# Patient Record
Sex: FEMALE | Race: WHITE | NOT HISPANIC OR LATINO | URBAN - METROPOLITAN AREA
[De-identification: names, ages, dates, MRNs, and addresses within clinical notes are randomized per-mention and may not be internally consistent; named-entity substitution may affect disease eponyms.]

---

## 2018-01-15 ENCOUNTER — EMERGENCY (EMERGENCY)
Facility: HOSPITAL | Age: 83
LOS: 1 days | Discharge: HOME | End: 2018-01-15
Admitting: EMERGENCY MEDICINE
Payer: COMMERCIAL

## 2018-01-15 VITALS
DIASTOLIC BLOOD PRESSURE: 76 MMHG | TEMPERATURE: 98 F | RESPIRATION RATE: 16 BRPM | OXYGEN SATURATION: 97 % | HEIGHT: 55 IN | HEART RATE: 92 BPM | SYSTOLIC BLOOD PRESSURE: 175 MMHG | WEIGHT: 154.1 LBS

## 2018-01-15 DIAGNOSIS — M54.5 LOW BACK PAIN: ICD-10-CM

## 2018-01-15 DIAGNOSIS — Z79.899 OTHER LONG TERM (CURRENT) DRUG THERAPY: ICD-10-CM

## 2018-01-15 PROCEDURE — 72100 X-RAY EXAM L-S SPINE 2/3 VWS: CPT

## 2018-01-15 PROCEDURE — 72100 X-RAY EXAM L-S SPINE 2/3 VWS: CPT | Mod: 26

## 2018-01-15 PROCEDURE — 99283 EMERGENCY DEPT VISIT LOW MDM: CPT | Mod: 25

## 2018-01-15 PROCEDURE — 99284 EMERGENCY DEPT VISIT MOD MDM: CPT

## 2018-01-15 RX ORDER — ACETAMINOPHEN 500 MG
650 TABLET ORAL ONCE
Qty: 0 | Refills: 0 | Status: COMPLETED | OUTPATIENT
Start: 2018-01-15 | End: 2018-01-15

## 2018-01-15 RX ORDER — IBUPROFEN 200 MG
600 TABLET ORAL ONCE
Qty: 0 | Refills: 0 | Status: COMPLETED | OUTPATIENT
Start: 2018-01-15 | End: 2018-01-15

## 2018-01-15 RX ORDER — IBUPROFEN 200 MG
1 TABLET ORAL
Qty: 10 | Refills: 0 | OUTPATIENT
Start: 2018-01-15 | End: 2018-01-19

## 2018-01-15 RX ADMIN — Medication 650 MILLIGRAM(S): at 13:58

## 2018-01-15 RX ADMIN — Medication 600 MILLIGRAM(S): at 13:58

## 2018-01-15 NOTE — ED PROVIDER NOTE - MEDICAL DECISION MAKING DETAILS
82 y/o female with b/l LBP s/p mechanical fall from yesterday. No suspicion for cauda equina. Pt does not appear to be in significant pain or distress. Xray negative for acute fx/sublux. Pain was treated. Pt is able to ambulate safely as seen in ED and is safe for dc. Pt was referred to fu with her PMD for further.

## 2018-01-15 NOTE — ED ADULT NURSE NOTE - NS ED NOTE  TALK SOMEONE YN
ED Positive Culture Follow-up/Notification Note:    Date: 1/5/17     Patient seen in the ED on 1/2/2017 for cough, achiness, runny nose, and congestion along with abdominal pain X 3 days. Patient is tolerating fluids. Rapid test positive for influenza A.    1. Influenza A    2. Situational anxiety       Discharge Medication List as of 1/2/2017 10:22 AM          Allergies: Review of patient's allergies indicates no known allergies.     Final cultures:   Results     Procedure Component Value Units Date/Time    RAPID STREP, CULT IF INDICATED (CULTURE IF NEGATIVE) [[id number]] Collected:  01/02/17 0909    Order Status:  Completed Specimen Information:  Throat from Throat Updated:  01/04/17 1616     Significant Indicator NEG      Source THRT      Site THROAT      Rapid Strep Screen --      Result:        Negative for Group A streptococcus.  A negative result may be obtained if the specimen is  inadequate or antigen concentration is below the  sensitivity of the test. This negative test will be followed  up with a culture as requested.      BETA STREP SCREEN (GP. A) [[id number]]  (Abnormal) Collected:  01/02/17 0909    Order Status:  Completed Specimen Information:  Throat Updated:  01/04/17 1616     Significant Indicator POS (POS)      Source THRT      Site THROAT      Beta Strep Screen Group A No Beta Streptococci Group A isolated. (A)      Beta Strep Screen Group A -- (A)      Result:        Beta Streptococcus Group F  Moderate growth      Influenza By PCR, A/B/H1N1 [[id number]]  (Abnormal) Collected:  01/02/17 0909    Order Status:  Completed Specimen Information:  Respirate from Nasopharyngeal Updated:  01/02/17 1039     Influenza virus A RNA POSITIVE (A)      Influenza virus B RNA Negative      Influenza A 2009, H1N1, PCR Not Detected     URINALYSIS,CULTURE IF INDICATED [[id number]]  (Abnormal) Collected:  01/02/17 0917    Order Status:  Completed Specimen Information:  Respirate Updated:  01/02/17 0943     Micro Urine  Req Microscopic      Comment: Microscopic performed on noncentrifuged urine. Total Vol <1.0 ml.        Color Yellow      Character Clear      Specific Gravity 1.015      Ph 6.0      Glucose Negative mg/dL      Ketones Trace (A) mg/dL      Protein 30 (A) mg/dL      Bilirubin Negative      Nitrite Negative      Leukocyte Esterase Negative      Occult Blood Moderate (A)      Culture Indicated No UA Culture     Influenza Rapid [[id number]]  (Abnormal) Collected:  01/02/17 0909    Order Status:  Completed Specimen Information:  Respirate from Respiratory Updated:  01/02/17 0936     Significant Indicator POS (POS)      Source RESP      Site Nasopharyngeal      Rapid Influenza A-B -- (A)      Result:        Positive for Influenza A antigen;  Negative for Influenza B antigen.      Narrative:      CALL  [facility]guilar  ER tel. ,          Plan:   Group F Strep is a common oropharyngeal colonizer, which is not likely causing disease. Non-Group A strep has not been definitively associated with acute rheumatic fever or glomerulonephritis and pharyngitis is generally a self-limiting disease with treatment aimed at reducing risk of developing rheumatic fever or glomerulonephritis. Additionally, patient tested positive for Influenza A, which is causing symptoms. No treatment indicated at this time.    Brian Concepcion       No

## 2018-01-15 NOTE — ED ADULT NURSE NOTE - OBJECTIVE STATEMENT
83 YOF c/o mechanical fall yesterday falling onto low back while trying to break down a box in her apartment. Pain to low back.  Denies head injury, numbness, tingling, shooting pain, abd pain, or loss of bowel/bladder control.

## 2018-01-15 NOTE — ED PROVIDER NOTE - OBJECTIVE STATEMENT
84 y/o female with no prior back injuries is present w/ b/l LBP x1 day. Pt states that yesterday she was trying to move a cardboard box by kicking it, however she lost her balance and fell backwards. Pt denies hitting or head or LOC. Pt reports pain is located on the lower back bilaterally. Pt is constant and increases with movement however slightly present at rest. Pt denies the following: previous injury to her back, saddle anesthesia, numbing/tingling of her extremities, loss of urine/bowel movement, blood in her urine.

## 2018-01-15 NOTE — ED PROVIDER NOTE - PHYSICAL EXAMINATION
GENERAL: WD/WN, in NAD  NEURO: Alert and oriented. CN II-XII intact. Motor strength 5/5 in all extremities.  SILT in all extremities.  Symmetric DTR  +2 bilaterally in biceps, BR, patellar.  HEAD: NC/AT  EYES: Clear bilaterally; Pupils equal and round  ENT: OP clear, no rhinorrhea  PULM: No signs of respiratory distress; lungs clear bilaterally  CV: RRR, S1S2, No MRG. Symmetric distal pulses bilaterally.  GI: Abdomen soft, nontender.  No abdominal masses or abnormal pulsations appreciated.  Normal rectal tone.  SKIN: normal color and turgor; no rash or lesions

## 2018-01-15 NOTE — ED PROVIDER NOTE - NS ED ROS FT
CONSTITUTIONAL: No fever/chills.  NEURO: no headache; no tingling/numbness/weakness in extremities; no saddle anesthesia.  CV: no chest pain or palpitations  PULM: no dyspnea, cough, or hemoptysis  GI: no abdominal pain; no N/V; no BRBPR or melena; no diarrhea or fecal incontinence  : no dysuria/hematura/frequency; no urinary incontinence or retention  MSK: no neck pain; no joint pain; no swelling of extremities  DERM: no rash or lesions

## 2019-03-09 ENCOUNTER — EMERGENCY (EMERGENCY)
Facility: HOSPITAL | Age: 84
LOS: 1 days | Discharge: HOME | End: 2019-03-09
Attending: EMERGENCY MEDICINE | Admitting: EMERGENCY MEDICINE
Payer: COMMERCIAL

## 2019-03-09 VITALS
HEART RATE: 78 BPM | RESPIRATION RATE: 18 BRPM | DIASTOLIC BLOOD PRESSURE: 81 MMHG | SYSTOLIC BLOOD PRESSURE: 154 MMHG | OXYGEN SATURATION: 97 % | TEMPERATURE: 99 F

## 2019-03-09 VITALS
DIASTOLIC BLOOD PRESSURE: 75 MMHG | SYSTOLIC BLOOD PRESSURE: 179 MMHG | HEART RATE: 89 BPM | OXYGEN SATURATION: 97 % | TEMPERATURE: 98 F | WEIGHT: 145.06 LBS | RESPIRATION RATE: 18 BRPM

## 2019-03-09 DIAGNOSIS — R07.89 OTHER CHEST PAIN: ICD-10-CM

## 2019-03-09 DIAGNOSIS — I10 ESSENTIAL (PRIMARY) HYPERTENSION: ICD-10-CM

## 2019-03-09 DIAGNOSIS — Z79.84 LONG TERM (CURRENT) USE OF ORAL HYPOGLYCEMIC DRUGS: ICD-10-CM

## 2019-03-09 DIAGNOSIS — E11.9 TYPE 2 DIABETES MELLITUS WITHOUT COMPLICATIONS: ICD-10-CM

## 2019-03-09 DIAGNOSIS — E78.00 PURE HYPERCHOLESTEROLEMIA, UNSPECIFIED: ICD-10-CM

## 2019-03-09 DIAGNOSIS — M54.6 PAIN IN THORACIC SPINE: ICD-10-CM

## 2019-03-09 DIAGNOSIS — Z79.899 OTHER LONG TERM (CURRENT) DRUG THERAPY: ICD-10-CM

## 2019-03-09 LAB — TROPONIN T SERPL-MCNC: <0.01 NG/ML — SIGNIFICANT CHANGE UP (ref 0–0.01)

## 2019-03-09 PROCEDURE — 85610 PROTHROMBIN TIME: CPT

## 2019-03-09 PROCEDURE — 71045 X-RAY EXAM CHEST 1 VIEW: CPT | Mod: 26

## 2019-03-09 PROCEDURE — 99285 EMERGENCY DEPT VISIT HI MDM: CPT

## 2019-03-09 PROCEDURE — 80053 COMPREHEN METABOLIC PANEL: CPT

## 2019-03-09 PROCEDURE — 99283 EMERGENCY DEPT VISIT LOW MDM: CPT | Mod: 25

## 2019-03-09 PROCEDURE — 85025 COMPLETE CBC W/AUTO DIFF WBC: CPT

## 2019-03-09 PROCEDURE — 36415 COLL VENOUS BLD VENIPUNCTURE: CPT

## 2019-03-09 PROCEDURE — 84484 ASSAY OF TROPONIN QUANT: CPT

## 2019-03-09 PROCEDURE — 85730 THROMBOPLASTIN TIME PARTIAL: CPT

## 2019-03-09 PROCEDURE — 82550 ASSAY OF CK (CPK): CPT

## 2019-03-09 PROCEDURE — 71045 X-RAY EXAM CHEST 1 VIEW: CPT

## 2019-03-09 PROCEDURE — 82553 CREATINE MB FRACTION: CPT

## 2019-03-09 RX ORDER — ATORVASTATIN CALCIUM 80 MG/1
1 TABLET, FILM COATED ORAL
Qty: 0 | Refills: 0 | COMMUNITY

## 2019-03-09 RX ORDER — ACETAMINOPHEN 500 MG
975 TABLET ORAL ONCE
Qty: 0 | Refills: 0 | Status: COMPLETED | OUTPATIENT
Start: 2019-03-09 | End: 2019-03-09

## 2019-03-09 RX ORDER — METFORMIN HYDROCHLORIDE 850 MG/1
1 TABLET ORAL
Qty: 0 | Refills: 0 | COMMUNITY

## 2019-03-09 RX ORDER — ATORVASTATIN CALCIUM 80 MG/1
0 TABLET, FILM COATED ORAL
Qty: 0 | Refills: 0 | COMMUNITY

## 2019-03-09 RX ORDER — QUINAPRIL HYDROCHLORIDE 40 MG/1
1 TABLET, FILM COATED ORAL
Qty: 0 | Refills: 0 | COMMUNITY

## 2019-03-09 RX ORDER — AMLODIPINE BESYLATE 2.5 MG/1
1 TABLET ORAL
Qty: 0 | Refills: 0 | COMMUNITY

## 2019-03-09 RX ADMIN — Medication 975 MILLIGRAM(S): at 16:11

## 2019-03-09 NOTE — ED PROVIDER NOTE - CARE PROVIDER_API CALL
Kevin Baer)  Cardiovascular Disease; Internal Medicine  [address] [facility], [address] [place] [address]  Phone: [phone]  Fax: [phone]  Follow Up Time:

## 2019-03-09 NOTE — ED PROVIDER NOTE - OBJECTIVE STATEMENT
83 yo hx of dm, htn, hdl w complaints of left upper back pain and L side of chest pain w radiation to L arm, states pain worsens with movement of L arm. 83 yo hx of dm, htn, hdl w complaints of left upper back pain and L side of chest pain w radiation to L arm, states pain worsens with movement of L arm. no worsening sx on exertion, leg swelling, Pt has not tried anything for symptoms, no other aggravating or relieving factors.

## 2019-03-09 NOTE — ED PROVIDER NOTE - CLINICAL SUMMARY MEDICAL DECISION MAKING FREE TEXT BOX
85 yo hx of dm, htn, hdl w complaints of left upper back pain and L side of chest pain w radiation to L arm, states pain worsens with movement of L arm. no worsening sx on exertion, leg swelling, Pt has not tried anything for symptoms, no other aggravating or relieving factors.   Pt w atypical pain, comfortable appearing, non ischemic ekg, vss, will give close pcp and cards fu. 85 yo hx of dm, htn, hdl w complaints of left upper back pain and L side of chest pain w radiation to L arm, states pain worsens with movement of L arm. no worsening sx on exertion, leg swelling, Pt has not tried anything for symptoms, no other aggravating or relieving factors.   Pt w atypical pain, comfortable appearing, non ischemic ekg, vss, pain improved w tylenol, will give close pcp and cards fu.

## 2019-03-09 NOTE — ED PROVIDER NOTE - NSFOLLOWUPCLINICS_GEN_ALL_ED_FT
[facility]  Family Medicine  [facility]  [place], [place] [facility]  Phone: [phone]  Fax:   Follow Up Time:

## 2019-03-09 NOTE — ED PROVIDER NOTE - NSFOLLOWUPINSTRUCTIONS_ED_ALL_ED_FT

## 2019-03-09 NOTE — ED PROVIDER NOTE - PHYSICAL EXAMINATION
CONSTITUTIONAL: Well appearing, well nourished, awake, alert and in no apparent distress.  HEENT: Head is atraumatic. Eyes clear bilaterally, normal EOMI. Airway patent.  CARDIAC: Normal rate, regular rhythm.  Heart sounds S1, S2.   RESPIRATORY: Breath sounds clear and equal bilaterally. no tachypnea, respiratory distress.   GASTROINTESTINAL: Abdomen soft, non-tender, no guarding, distension.  MUSCULOSKELETAL: Spine appears normal, no midline spinal tenderness, range of motion is not limited, no muscle or joint tenderness. no bony tenderness.   NEUROLOGICAL: Alert and oriented, no focal deficits, no motor or sensory deficits.  SKIN: Skin normal color for race, warm, dry and intact. No evidence of rash.  PSYCHIATRIC: Alert and oriented to person, place, time/situation. normal mood and affect. no apparent risk to self or others. CONSTITUTIONAL: Well appearing, well nourished, awake, alert and in no apparent distress.  HEENT: Head is atraumatic. Eyes clear bilaterally, normal EOMI. Airway patent.  CARDIAC: Normal rate, regular rhythm.  Heart sounds S1, S2.   RESPIRATORY: Breath sounds clear and equal bilaterally. no tachypnea, respiratory distress.   GASTROINTESTINAL: Abdomen soft, non-tender, no guarding, distension.  MUSCULOSKELETAL: Spine appears normal, no midline spinal tenderness, range of motion is not limited, no muscle or joint tenderness. no bony tenderness. reproducible pain w movement of L arm.  NEUROLOGICAL: Alert and oriented, no focal deficits, no motor or sensory deficits.  SKIN: Skin normal color for race, warm, dry and intact. No evidence of rash.  PSYCHIATRIC: Alert and oriented to person, place, time/situation. normal mood and affect. no apparent risk to self or others.

## 2024-02-28 NOTE — ED ADULT NURSE NOTE - CHIEF COMPLAINT QUOTE
pt had mechanical fall yesterday c/o lower back pain. denies any numbness, tingling.
Simple: Patient demonstrates quick and easy understanding/Verbalized Understanding